# Patient Record
Sex: FEMALE
[De-identification: names, ages, dates, MRNs, and addresses within clinical notes are randomized per-mention and may not be internally consistent; named-entity substitution may affect disease eponyms.]

---

## 2018-10-22 NOTE — ED PHYSICIAN DOCUMENTATION
PD HPI OPHTHO





- Stated complaint


Stated Complaint: LEFT EYE PX





- Chief complaint


Chief Complaint: Heent





- History obtained from


History obtained from: Patient, Family





- History of Present Illness


Timing - onset: Today (Mid afternoon she was taking some pillows off of a shelf 

and some dust or material fluid in her left eye and she has left eye pain 

without visual deficit.  When I enter the room she is already had proparacaine 

placed with resolution of her symptoms.)





Review of Systems


Constitutional: reports: Reviewed and negative


Eyes: reports: Photophobia, Discharge, Irritation.  denies: Loss of vision, 

Decreased vision


Ears: denies: Loss of hearing, Ear pain





PD PAST MEDICAL HISTORY





- Past Medical History


Past Medical History: No





- Past Surgical History


Past Surgical History: Yes


General: Cholecystectomy, Appendectomy


Ortho: Knee replacement


HEENT: Tonsil/Adenoidectomy





- Present Medications


Home Medications: 


                                Ambulatory Orders











 Medication  Instructions  Recorded  Confirmed


 


Erythromycin Base [Erythromycin 1 applic OP 5XD #1 oint...g. 10/22/18 





Ophthalmic Ointment]   














- Allergies


Allergies/Adverse Reactions: 


                                    Allergies











Allergy/AdvReac Type Severity Reaction Status Date / Time


 


No Known Drug Allergies Allergy   Verified 10/22/18 19:35














- Social History


Does the pt smoke?: No


Smoking Status: Never smoker


Does the pt drink ETOH?: Yes


Does the pt have substance abuse?: No





- Immunizations


Immunizations are current?: No


Immunizations: TDAP >10years/unknown





PD ED PE NORMAL





- Vitals


Vital signs reviewed: Yes





- General


General: Alert and oriented X 3, No acute distress





- HEENT


HEENT: PERRL, EOMI, Other (Red inflamed conjunctiva on the left, no foreign body

anterior or in the fornices.  On fluorescein examination there is extensive 

skating rink type pattern of medial corneal abrasion.)





- Neuro


Neuro: Alert and oriented X 3, Normal speech





Results





- Vitals


Vitals: 





                               Vital Signs - 24 hr











  10/22/18





  19:29


 


Temperature 36.5 C


 


Heart Rate 76


 


Respiratory 17





Rate 


 


Blood Pressure 184/85 H


 


O2 Saturation 99














Departure





- Departure


Disposition: 01 Home, Self Care


Clinical Impression: 


Corneal abrasion, left


Qualifiers:


 Encounter type: initial encounter Qualified Code(s): S05.02XA - Injury of 

conjunctiva and corneal abrasion without foreign body, left eye, initial 

encounter





Condition: Good


Record reviewed to determine appropriate education?: Yes


Instructions:  ED Eye Injury Corneal Abrasion


Prescriptions: 


Erythromycin Base [Erythromycin Ophthalmic Ointment] 1 applic OP 5XD #1 

oint...g.


Comments: 


Recheck with your eye doctor in 2-3 days if symptoms are persistent, return for 

new or worsening symptoms.





Your blood pressure was elevated today on check into the emergency department.  

This does not mean that you have hypertension, it is a common phenomenon to come

to the emergency department and have elevated blood pressure.  I recommend that 

you see your primary care physician within the week to have it rechecked when 

you are feeling better.

## 2021-07-16 ENCOUNTER — HOSPITAL ENCOUNTER (OUTPATIENT)
Dept: HOSPITAL 76 - LAB.S | Age: 71
Discharge: HOME | End: 2021-07-16
Attending: NURSE PRACTITIONER
Payer: MEDICARE

## 2021-07-16 DIAGNOSIS — E88.81: ICD-10-CM

## 2021-07-16 DIAGNOSIS — E78.00: ICD-10-CM

## 2021-07-16 DIAGNOSIS — I10: Primary | ICD-10-CM

## 2021-07-16 DIAGNOSIS — R94.6: ICD-10-CM

## 2021-07-16 DIAGNOSIS — R79.82: ICD-10-CM

## 2021-07-16 LAB
BASOPHILS NFR BLD AUTO: 0 10^3/UL (ref 0–0.1)
BASOPHILS NFR BLD AUTO: 0.7 %
EOSINOPHIL # BLD AUTO: 0.2 10^3/UL (ref 0–0.7)
EOSINOPHIL NFR BLD AUTO: 2.8 %
ERYTHROCYTE [DISTWIDTH] IN BLOOD BY AUTOMATED COUNT: 13.4 % (ref 12–15)
EST. AVERAGE GLUCOSE BLD GHB EST-MCNC: 111 MG/DL (ref 70–100)
HBA1C MFR BLD HPLC: 5.5 % (ref 4.27–6.07)
HCT VFR BLD AUTO: 37.3 % (ref 37–47)
HGB UR QL STRIP: 11.9 G/DL (ref 12–16)
LYMPHOCYTES # SPEC AUTO: 1.3 10^3/UL (ref 1.5–3.5)
LYMPHOCYTES NFR BLD AUTO: 21.7 %
MCH RBC QN AUTO: 30.8 PG (ref 27–31)
MCHC RBC AUTO-ENTMCNC: 31.9 G/DL (ref 32–36)
MCV RBC AUTO: 96.6 FL (ref 81–99)
MONOCYTES # BLD AUTO: 0.5 10^3/UL (ref 0–1)
MONOCYTES NFR BLD AUTO: 7.4 %
NEUTROPHILS # BLD AUTO: 4.1 10^3/UL (ref 1.5–6.6)
NEUTROPHILS # SNV AUTO: 6.1 X10^3/UL (ref 4.8–10.8)
NEUTROPHILS NFR BLD AUTO: 66.7 %
NRBC # BLD AUTO: 0 /100WBC
NRBC # BLD AUTO: 0 X10^3/UL
PDW BLD AUTO: 12.2 FL (ref 7.9–10.8)
PLATELET # BLD: 264 10^3/UL (ref 130–450)
RBC MAR: 3.86 10^6/UL (ref 4.2–5.4)
T3FREE SERPL-MCNC: 2.87 PG/ML (ref 2.5–3.9)
T4 FREE SERPL-MCNC: 1.23 NG/DL (ref 0.58–1.64)
TSH SERPL-ACNC: 2.26 UIU/ML (ref 0.34–5.6)

## 2021-07-16 PROCEDURE — 83036 HEMOGLOBIN GLYCOSYLATED A1C: CPT

## 2021-07-16 PROCEDURE — 84443 ASSAY THYROID STIM HORMONE: CPT

## 2021-07-16 PROCEDURE — 81599 UNLISTED MAAA: CPT

## 2021-07-16 PROCEDURE — 83525 ASSAY OF INSULIN: CPT

## 2021-07-16 PROCEDURE — 84439 ASSAY OF FREE THYROXINE: CPT

## 2021-07-16 PROCEDURE — 85025 COMPLETE CBC W/AUTO DIFF WBC: CPT

## 2021-07-16 PROCEDURE — 86140 C-REACTIVE PROTEIN: CPT

## 2021-07-16 PROCEDURE — 84481 FREE ASSAY (FT-3): CPT

## 2021-07-16 PROCEDURE — 84482 T3 REVERSE: CPT

## 2021-07-16 PROCEDURE — 36415 COLL VENOUS BLD VENIPUNCTURE: CPT

## 2022-04-19 ENCOUNTER — HOSPITAL ENCOUNTER (OUTPATIENT)
Dept: HOSPITAL 76 - LAB.S | Age: 72
Discharge: HOME | End: 2022-04-19
Attending: NATUROPATH
Payer: MEDICARE

## 2022-04-19 DIAGNOSIS — Z13.1: ICD-10-CM

## 2022-04-19 DIAGNOSIS — Z13.0: ICD-10-CM

## 2022-04-19 DIAGNOSIS — E78.5: Primary | ICD-10-CM

## 2022-04-19 DIAGNOSIS — Z13.220: ICD-10-CM

## 2022-04-19 DIAGNOSIS — Z13.29: ICD-10-CM

## 2022-04-19 DIAGNOSIS — Z13.21: ICD-10-CM

## 2022-04-19 DIAGNOSIS — Z13.6: ICD-10-CM

## 2022-04-19 LAB
ALBUMIN DIAFP-MCNC: 3.7 G/DL (ref 3.2–5.5)
ALBUMIN/GLOB SERPL: 1.3 {RATIO} (ref 1–2.2)
ALP SERPL-CCNC: 69 IU/L (ref 42–121)
ALT SERPL W P-5'-P-CCNC: 14 IU/L (ref 10–60)
ANION GAP SERPL CALCULATED.4IONS-SCNC: 11 MMOL/L (ref 6–13)
AST SERPL W P-5'-P-CCNC: 18 IU/L (ref 10–42)
BASOPHILS NFR BLD AUTO: 0.1 10^3/UL (ref 0–0.1)
BASOPHILS NFR BLD AUTO: 0.9 %
BILIRUB BLD-MCNC: 0.6 MG/DL (ref 0.2–1)
BUN SERPL-MCNC: 15 MG/DL (ref 6–20)
CALCIUM UR-MCNC: 9.4 MG/DL (ref 8.5–10.3)
CHLORIDE SERPL-SCNC: 98 MMOL/L (ref 101–111)
CHOLEST SERPL-MCNC: 247 MG/DL
CO2 SERPL-SCNC: 28 MMOL/L (ref 21–32)
CREAT SERPLBLD-SCNC: 0.7 MG/DL (ref 0.4–1)
CRP SERPL HS-MCNC: 15.6 MG/L
EOSINOPHIL # BLD AUTO: 0.2 10^3/UL (ref 0–0.7)
EOSINOPHIL NFR BLD AUTO: 3.2 %
ERYTHROCYTE [DISTWIDTH] IN BLOOD BY AUTOMATED COUNT: 14.8 % (ref 12–15)
GFRSERPLBLD MDRD-ARVRAT: 83 ML/MIN/{1.73_M2} (ref 89–?)
GLOBULIN SER-MCNC: 2.9 G/DL (ref 2.1–4.2)
GLUCOSE SERPL-MCNC: 103 MG/DL (ref 70–100)
HCT VFR BLD AUTO: 33.3 % (ref 37–47)
HDLC SERPL-MCNC: 52 MG/DL
HDLC SERPL: 4.8 {RATIO} (ref ?–4.4)
HGB UR QL STRIP: 10.4 G/DL (ref 12–16)
LDLC SERPL CALC-MCNC: 171 MG/DL
LDLC/HDLC SERPL: 3.3 {RATIO} (ref ?–4.4)
LYMPHOCYTES # SPEC AUTO: 1.1 10^3/UL (ref 1.5–3.5)
LYMPHOCYTES NFR BLD AUTO: 18.9 %
MCH RBC QN AUTO: 30 PG (ref 27–31)
MCHC RBC AUTO-ENTMCNC: 31.2 G/DL (ref 32–36)
MCV RBC AUTO: 96 FL (ref 81–99)
MONOCYTES # BLD AUTO: 0.4 10^3/UL (ref 0–1)
MONOCYTES NFR BLD AUTO: 7.3 %
NEUTROPHILS # BLD AUTO: 4.1 10^3/UL (ref 1.5–6.6)
NEUTROPHILS # SNV AUTO: 5.9 X10^3/UL (ref 4.8–10.8)
NEUTROPHILS NFR BLD AUTO: 68.8 %
NRBC # BLD AUTO: 0 /100WBC
NRBC # BLD AUTO: 0 X10^3/UL
PDW BLD AUTO: 11.8 FL (ref 7.9–10.8)
PLATELET # BLD: 271 10^3/UL (ref 130–450)
POTASSIUM SERPL-SCNC: 5.1 MMOL/L (ref 3.5–5)
PROT SPEC-MCNC: 6.6 G/DL (ref 6.7–8.2)
RBC MAR: 3.47 10^6/UL (ref 4.2–5.4)
SODIUM SERPLBLD-SCNC: 137 MMOL/L (ref 135–145)
T3FREE SERPL-MCNC: 2.7 PG/ML (ref 2.5–3.9)
T4 FREE SERPL-MCNC: 0.94 NG/DL (ref 0.58–1.64)
TRIGL P FAST SERPL-MCNC: 122 MG/DL
TSH SERPL-ACNC: 2.74 UIU/ML (ref 0.34–5.6)
VLDLC SERPL-SCNC: 24 MG/DL

## 2022-04-19 PROCEDURE — 84443 ASSAY THYROID STIM HORMONE: CPT

## 2022-04-19 PROCEDURE — 83525 ASSAY OF INSULIN: CPT

## 2022-04-19 PROCEDURE — 86141 C-REACTIVE PROTEIN HS: CPT

## 2022-04-19 PROCEDURE — 83721 ASSAY OF BLOOD LIPOPROTEIN: CPT

## 2022-04-19 PROCEDURE — 80061 LIPID PANEL: CPT

## 2022-04-19 PROCEDURE — 84481 FREE ASSAY (FT-3): CPT

## 2022-04-19 PROCEDURE — 80053 COMPREHEN METABOLIC PANEL: CPT

## 2022-04-19 PROCEDURE — 36415 COLL VENOUS BLD VENIPUNCTURE: CPT

## 2022-04-19 PROCEDURE — 81599 UNLISTED MAAA: CPT

## 2022-04-19 PROCEDURE — 84482 T3 REVERSE: CPT

## 2022-04-19 PROCEDURE — 84439 ASSAY OF FREE THYROXINE: CPT

## 2022-04-19 PROCEDURE — 83036 HEMOGLOBIN GLYCOSYLATED A1C: CPT

## 2022-04-19 PROCEDURE — 85025 COMPLETE CBC W/AUTO DIFF WBC: CPT

## 2022-12-05 ENCOUNTER — HOSPITAL ENCOUNTER (OUTPATIENT)
Dept: HOSPITAL 76 - DI.S | Age: 72
Discharge: HOME | End: 2022-12-05
Attending: PHYSICIAN ASSISTANT
Payer: MEDICARE

## 2022-12-05 DIAGNOSIS — M19.072: Primary | ICD-10-CM

## 2022-12-05 NOTE — XRAY REPORT
PROCEDURE:  Foot 3 View LT

 

INDICATIONS:  PAIN IN LEFT FOOT

 

TECHNIQUE:  3 views of the foot were acquired.  

 

COMPARISON:  None

 

FINDINGS:  

 

Bones:  No fractures or dislocations.  No suspicious bony lesions.  

 

Soft tissues:  No tibiotalar joint effusion.  Achilles tendon appears normal.  

 

There are calcaneal heel spur at insertion of the plantar fascia and Achilles tendons.

 

There is mild osteoarthritic type degenerative change involving the left first metatarsophalangeal gregor
int.

 

IMPRESSION:  

1. No evidence for acute osseous abnormality involving the left foot.

2. 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

 

Reviewed by: Edwin Ibarra MD on 12/5/2022 3:57 PM PST

Approved by: Edwin Ibarra MD on 12/5/2022 3:57 PM PST

 

 

Station ID:  [id number]